# Patient Record
Sex: FEMALE | Race: WHITE
[De-identification: names, ages, dates, MRNs, and addresses within clinical notes are randomized per-mention and may not be internally consistent; named-entity substitution may affect disease eponyms.]

---

## 2018-09-12 ENCOUNTER — HOSPITAL ENCOUNTER (OUTPATIENT)
Dept: HOSPITAL 92 - CTENTCT | Age: 63
Discharge: HOME | End: 2018-09-12
Attending: OTOLARYNGOLOGY
Payer: MEDICARE

## 2018-09-12 DIAGNOSIS — J32.9: Primary | ICD-10-CM

## 2018-09-12 PROCEDURE — 70486 CT MAXILLOFACIAL W/O DYE: CPT

## 2018-11-01 ENCOUNTER — HOSPITAL ENCOUNTER (OUTPATIENT)
Dept: HOSPITAL 92 - SDC | Age: 63
End: 2018-11-01
Attending: OTOLARYNGOLOGY
Payer: MEDICARE

## 2018-11-01 VITALS — BODY MASS INDEX: 27.4 KG/M2

## 2018-11-01 DIAGNOSIS — J30.1: ICD-10-CM

## 2018-11-01 DIAGNOSIS — B96.89: ICD-10-CM

## 2018-11-01 DIAGNOSIS — Z79.899: ICD-10-CM

## 2018-11-01 DIAGNOSIS — J30.81: ICD-10-CM

## 2018-11-01 DIAGNOSIS — E03.9: ICD-10-CM

## 2018-11-01 DIAGNOSIS — J30.89: ICD-10-CM

## 2018-11-01 DIAGNOSIS — Z98.890: ICD-10-CM

## 2018-11-01 DIAGNOSIS — I10: ICD-10-CM

## 2018-11-01 DIAGNOSIS — B95.61: ICD-10-CM

## 2018-11-01 DIAGNOSIS — J32.8: Primary | ICD-10-CM

## 2018-11-01 DIAGNOSIS — J33.9: ICD-10-CM

## 2018-11-01 DIAGNOSIS — E78.00: ICD-10-CM

## 2018-11-01 DIAGNOSIS — Z88.2: ICD-10-CM

## 2018-11-01 LAB
ANION GAP SERPL CALC-SCNC: 12 MMOL/L (ref 10–20)
BUN SERPL-MCNC: 28 MG/DL (ref 9.8–20.1)
CALCIUM SERPL-MCNC: 9.8 MG/DL (ref 7.8–10.44)
CHLORIDE SERPL-SCNC: 102 MMOL/L (ref 98–107)
CO2 SERPL-SCNC: 28 MMOL/L (ref 23–31)
CREAT CL PREDICTED SERPL C-G-VRATE: 64 ML/MIN (ref 70–130)
GLUCOSE SERPL-MCNC: 83 MG/DL (ref 80–115)
HGB BLD-MCNC: 12.3 G/DL (ref 12–16)
POTASSIUM SERPL-SCNC: 3.4 MMOL/L (ref 3.5–5.1)
SODIUM SERPL-SCNC: 139 MMOL/L (ref 136–145)

## 2018-11-01 PROCEDURE — 8E09XBZ COMPUTER ASSISTED PROCEDURE OF HEAD AND NECK REGION: ICD-10-PCS | Performed by: OTOLARYNGOLOGY

## 2018-11-01 PROCEDURE — 09TU8ZZ RESECTION OF RIGHT ETHMOID SINUS, VIA NATURAL OR ARTIFICIAL OPENING ENDOSCOPIC: ICD-10-PCS | Performed by: OTOLARYNGOLOGY

## 2018-11-01 PROCEDURE — 09BQ8ZZ EXCISION OF RIGHT MAXILLARY SINUS, VIA NATURAL OR ARTIFICIAL OPENING ENDOSCOPIC: ICD-10-PCS | Performed by: OTOLARYNGOLOGY

## 2018-11-01 PROCEDURE — 87205 SMEAR GRAM STAIN: CPT

## 2018-11-01 PROCEDURE — 09BR8ZZ EXCISION OF LEFT MAXILLARY SINUS, VIA NATURAL OR ARTIFICIAL OPENING ENDOSCOPIC: ICD-10-PCS | Performed by: OTOLARYNGOLOGY

## 2018-11-01 PROCEDURE — 93005 ELECTROCARDIOGRAM TRACING: CPT

## 2018-11-01 PROCEDURE — 099T8ZZ DRAINAGE OF LEFT FRONTAL SINUS, VIA NATURAL OR ARTIFICIAL OPENING ENDOSCOPIC: ICD-10-PCS | Performed by: OTOLARYNGOLOGY

## 2018-11-01 PROCEDURE — 36415 COLL VENOUS BLD VENIPUNCTURE: CPT

## 2018-11-01 PROCEDURE — 85018 HEMOGLOBIN: CPT

## 2018-11-01 PROCEDURE — 87077 CULTURE AEROBIC IDENTIFY: CPT

## 2018-11-01 PROCEDURE — 099S8ZZ DRAINAGE OF RIGHT FRONTAL SINUS, VIA NATURAL OR ARTIFICIAL OPENING ENDOSCOPIC: ICD-10-PCS | Performed by: OTOLARYNGOLOGY

## 2018-11-01 PROCEDURE — 87102 FUNGUS ISOLATION CULTURE: CPT

## 2018-11-01 PROCEDURE — 96374 THER/PROPH/DIAG INJ IV PUSH: CPT

## 2018-11-01 PROCEDURE — 80048 BASIC METABOLIC PNL TOTAL CA: CPT

## 2018-11-01 PROCEDURE — 87070 CULTURE OTHR SPECIMN AEROBIC: CPT

## 2018-11-01 PROCEDURE — 85014 HEMATOCRIT: CPT

## 2018-11-01 PROCEDURE — 87186 SC STD MICRODIL/AGAR DIL: CPT

## 2018-11-01 PROCEDURE — 87206 SMEAR FLUORESCENT/ACID STAI: CPT

## 2018-11-01 PROCEDURE — 09TV8ZZ RESECTION OF LEFT ETHMOID SINUS, VIA NATURAL OR ARTIFICIAL OPENING ENDOSCOPIC: ICD-10-PCS | Performed by: OTOLARYNGOLOGY

## 2018-11-01 PROCEDURE — 93010 ELECTROCARDIOGRAM REPORT: CPT

## 2018-11-01 NOTE — EKG
Test Reason : PREOP

Blood Pressure : ***/*** mmHG

Vent. Rate : 077 BPM     Atrial Rate : 077 BPM

   P-R Int : 160 ms          QRS Dur : 070 ms

    QT Int : 386 ms       P-R-T Axes : 067 038 036 degrees

   QTc Int : 436 ms

 

Normal sinus rhythm

Normal ECG

 

Confirmed by REJI MARES ([id number]) on 11/1/2018 3:41:55 PM

 

Referred By:  MAGEN           Confirmed By:REJI MARES

## 2018-11-01 NOTE — OP
PREOPERATIVE DIAGNOSES:  Chronic sinusitis, allergic fungal sinusitis, facial pain, hypertrophic infe
rior turbinates.

 

POSTOPERATIVE DIAGNOSES:  Chronic sinusitis, allergic fungal sinusitis, facial pain, hypertrophic inf
erior turbinates.

 

PROCEDURES PERFORMED:

1.  Stereotactic image guidance surgery with landmark.

2.  Bilateral nasal endoscopy with maxillary antrostomy with removal of tissue, _____.

3.  Bilateral nasal endoscopy with frontal sinusotomy.

4.  Bilateral nasal endoscopy with total ethmoidectomy.

 

FINDINGS:  The patient had extensive scarring and recurrence of tonsils with presence of abundant of 
allergic fungal _____ frontal recess.  Bone was hyperostotic and thickened.  The landmark was used to
 help facilitate a frontal sinusotomy bilaterally.

 

DESCRIPTION OF PROCEDURES: 

After consent was obtained, the patient was identified, brought to the operating room, and placed on 
the operating room table in the supine position.  Consent was obtained, notifying the patient of the 
possibility of additional infections, bleeding, brain injury, and eye/orbital injury.   The patient w
as placed on the operating room table, and general endotracheal anesthesia and intravenous access was
 obtained.  The patient was then positioned, prepped and draped for endoscopic sinus surgery.  Nasal 
preparation included trimming nasal vestibular hairs and spraying in topical Afrin.  We then placed A
frin topical solution on nasal pledgets and strategically located them intranasally.  The perinasal m
ucosa was injected with 1% lidocaine with 1:100,000 epinephrine in the submucoperichondrial plane of 
the septum, lateral nasal wall, and anterior to the uncinate.  The patient was then prepped and drape
d in a sterile fashion and positioned for endoscopic sinus surgery.

 

Endoscopic Sinus Surgery:  With the 0-degree endoscope, the patient underwent systematic nasal endosc
opy.  There were no suspicious internasal masses or lesions identified.  We then focused our attentio
n to the osteomeatal complex region under the middle turbinate.

 

Maxillary Antrostomy:  The uncinate was then identified and the extent of the uncinate was appreciate
d by out-fracturing the uncinate with the ball-tip probe.  We then used the sickle blade to disarticu
late the uncinate from the lateral nasal wall.  This was then removed with straight biting and upbiti
ng punches with the remaining shrouds of mucosa and bony septum removed with the micro-debrider.  The
 natural os of the maxillary sinus was then identified and enlarged with the maxillary punches and ba
ck biting forceps.

 

Total Ethmoidectomy:  The anterior face of the ethmoid bulla was entered and with the micro-debrider,
 dissection continued posteriorly to the ground lamella.  The limits of dissection included the inser
tion of the middle turbinate, medial orbital wall, and base of skull.  We similarly identified the fr
ontal recess and removed shrouds of bone and debris in that region to obtain patency into the agger n
asi region and frontal recess.  We then entered the ground lamella and its anteroinferior aspect and 
proceeded posteriorly, opening the posterior ethmoid air-cell system.  Again, the limits of dissectio
n included the base of skull and medial orbital wall.

 

At this point, we then turned our attention to the contralateral side and proceeded with endoscopic s
inus surgery.

 

At the completion of the case, Rice keel splints were placed in the ethmoid cavities after the ethmoi
dectomy.  There were no complications.  The patient tolerated the procedure well and was discharged t
o the recovery room in stable condition prior to return to the preoperative Day Stay with [facility].  Prescriptions for pain medication and antibiotics were provided.  The patient received
 intramuscular Depo-Medrol during the case.